# Patient Record
Sex: FEMALE | Race: OTHER | NOT HISPANIC OR LATINO
[De-identification: names, ages, dates, MRNs, and addresses within clinical notes are randomized per-mention and may not be internally consistent; named-entity substitution may affect disease eponyms.]

---

## 2022-05-07 ENCOUNTER — NON-APPOINTMENT (OUTPATIENT)
Age: 72
End: 2022-05-07

## 2022-05-09 ENCOUNTER — TRANSCRIPTION ENCOUNTER (OUTPATIENT)
Age: 72
End: 2022-05-09

## 2022-05-10 ENCOUNTER — OUTPATIENT (OUTPATIENT)
Dept: OUTPATIENT SERVICES | Facility: HOSPITAL | Age: 72
LOS: 1 days | End: 2022-05-10

## 2022-05-10 ENCOUNTER — APPOINTMENT (OUTPATIENT)
Age: 72
End: 2022-05-10

## 2022-05-10 VITALS
RESPIRATION RATE: 15 BRPM | WEIGHT: 115.96 LBS | SYSTOLIC BLOOD PRESSURE: 152 MMHG | OXYGEN SATURATION: 98 % | DIASTOLIC BLOOD PRESSURE: 83 MMHG | HEART RATE: 82 BPM | HEIGHT: 60 IN | TEMPERATURE: 98 F

## 2022-05-10 VITALS
DIASTOLIC BLOOD PRESSURE: 73 MMHG | SYSTOLIC BLOOD PRESSURE: 153 MMHG | OXYGEN SATURATION: 100 % | HEART RATE: 60 BPM | RESPIRATION RATE: 17 BRPM | TEMPERATURE: 98 F

## 2022-05-10 DIAGNOSIS — U07.1 COVID-19: ICD-10-CM

## 2022-05-10 RX ORDER — BEBTELOVIMAB 87.5 MG/ML
175 INJECTION, SOLUTION INTRAVENOUS ONCE
Refills: 0 | Status: COMPLETED | OUTPATIENT
Start: 2022-05-10 | End: 2022-05-10

## 2022-05-10 RX ADMIN — BEBTELOVIMAB 175 MILLIGRAM(S): 87.5 INJECTION, SOLUTION INTRAVENOUS at 15:31

## 2022-05-10 NOTE — MONOCLONAL ANTIBODY INFUSION - EXAM
CC: Monoclonal Antibody Infusion/COVID 19 Positive  72y Female w/ PmHx of CKD, DM<HTN and recent dx of COVID-19 who presents today for elective Bebtelovimab infusion. Patient has been screened and was deemed to be a candidate by Lyly Helm    Symptoms/Criteria:cough, fever,   Date of Symptom Onset: 5/6  Date of Positive COVID PCR:5/8  Risk Profile includes:HTN>DM    Vaccination Status: x1, pfizer x1      Physical Exam/findings:  T(C): 36.8 (05-10-22 @ 15:45), Max: 36.9 (05-10-22 @ 15:18)  HR: 60 (05-10-22 @ 15:45) (60 - 82)  BP: 154/87 (05-10-22 @ 15:45) (152/83 - 154/87)  RR: 19 (05-10-22 @ 15:45) (15 - 19)  SpO2: 99% (05-10-22 @ 15:45) (98% - 99%)    PE:   Appearance: NAD	  HEENT:   Normal oral mucosa, NC/AT  Lymphatic: No lymphadenopathy  Cardiovascular: Normal S1 S2, No JVD, No edema  Respiratory: Lungs clear to auscultation, no accessory muscle use or intracostal retraction  Gastrointestinal:  BS (+), Soft, non-tender, non-distended  Skin: Warm and dry  Neurologic: Non-focal, CN II- XII grossly intact  Extremities: Normal range of motion

## 2022-05-10 NOTE — MONOCLONAL ANTIBODY INFUSION - ASSESSMENT AND PLAN
ASSESSMENT:  Pt is a 72y Female recently diagnosed with Covid-19 infection who was referred for monoclonal antibody (Bebtelovimab) infusion after testing positive for COVID-19 on 5/8    PLAN:    I have reviewed the Bebtelovimab Emergency Use Authorization (EUA) and I have provided the patient or patient's caregiver with the following information:    1. The FDA has authorized emergency use of Bebtelovimab, it is not an FDA approved biological product & research is ongoing.  2. The patient or patient's caregiver has the option to accept or refuse administration of Bebtelovimab.  3. The significant known and potential risks and benefits of Bebtelovimab and the extent to which such risks and benefits are unknown.  4. Information on available alternative treatments and the risks/benefits of those alternatives.  5. Patient instructed to self-isolate & use infection control measures (e.g wear mask, isolate, social distance, avoid sharing personal items, clean & disinfect "high touch" surfaces, & frequent handwashing) according to the CDC guidelines.     - Injection procedure explained to patient  - Consent for mAb injection obtained; Patient verbalized understanding of plan and agrees to treatment  - Risk & benefits discussed/all questions answered to the best of my ability  - Bebtelovimab 175mg IVP x1 over ~ 1 minute  - Observe patient for one hour post medication administration  - D/C patient with fact sheet explaining symptoms to be aware of for the next couple of days along with contact information for the 24/7 clinical call center should they experience any symptoms  - Outpatient PCP follow up for further management recommendations

## 2022-05-13 ENCOUNTER — TRANSCRIPTION ENCOUNTER (OUTPATIENT)
Age: 72
End: 2022-05-13